# Patient Record
Sex: MALE | Race: WHITE | NOT HISPANIC OR LATINO | Employment: FULL TIME | URBAN - METROPOLITAN AREA
[De-identification: names, ages, dates, MRNs, and addresses within clinical notes are randomized per-mention and may not be internally consistent; named-entity substitution may affect disease eponyms.]

---

## 2018-08-23 ENCOUNTER — TELEPHONE (OUTPATIENT)
Dept: FAMILY MEDICINE | Facility: CLINIC | Age: 43
End: 2018-08-23

## 2018-08-23 ENCOUNTER — OFFICE VISIT (OUTPATIENT)
Dept: FAMILY MEDICINE | Facility: CLINIC | Age: 43
End: 2018-08-23
Payer: COMMERCIAL

## 2018-08-23 VITALS
WEIGHT: 200 LBS | HEART RATE: 71 BPM | DIASTOLIC BLOOD PRESSURE: 78 MMHG | BODY MASS INDEX: 28 KG/M2 | HEIGHT: 71 IN | OXYGEN SATURATION: 97 % | SYSTOLIC BLOOD PRESSURE: 120 MMHG | TEMPERATURE: 98.3 F

## 2018-08-23 DIAGNOSIS — R10.13 ABDOMINAL PAIN, EPIGASTRIC: Primary | ICD-10-CM

## 2018-08-23 PROCEDURE — 99203 OFFICE O/P NEW LOW 30 MIN: CPT | Performed by: NURSE PRACTITIONER

## 2018-08-23 RX ORDER — SUCRALFATE 1 G/1
1 TABLET ORAL 4 TIMES DAILY PRN
Qty: 30 TABLET | Refills: 0 | Status: SHIPPED | OUTPATIENT
Start: 2018-08-23 | End: 2019-07-29

## 2018-08-23 NOTE — PATIENT INSTRUCTIONS
See how you feel tomorrow. If you still have discomfort, then start the medication. Take it 1 hour before eating or 2 hours after eating. This will coat the stomach and help it heal.  Eat a really bland diet.   No coffee or alcohol.   If you symptoms do not resolve or you develop new symptoms, please let us know       Tips to Control Acid Reflux  To control acid reflux, you ll need to make some basic diet and lifestyle changes. The simple steps outlined below may be all you ll need to ease discomfort.  Watch what you eat    Avoid fatty foods and spicy foods.    Eat fewer acidic foods, such as citrus and tomato-based foods. These can increase symptoms.    Limit drinking alcohol, caffeine, and fizzy beverages. All increase acid reflux.    Try limiting chocolate, peppermint, and spearmint. These can worsen acid reflux in some people.  Watch when you eat    Avoid lying down for 3 hours after eating.    Do not snack before going to bed.  Raise your head  Raising your head and upper body by 4 to 6 inches helps limit reflux when you re lying down. Put blocks under the head of your bed frame to raise it.  Other changes    Lose weight, if you need to    Don t exercise near bedtime    Avoid tight-fitting clothes    Limit aspirin and ibuprofen    Stop smoking   Date Last Reviewed: 7/1/2016 2000-2017 [employer]. [address] [facility], [place] [address]. All rights reserved. This information is not intended as a substitute for professional medical care. Always follow your healthcare professional's instructions.

## 2018-08-23 NOTE — TELEPHONE ENCOUNTER
Reason for call:  Other   Patient called regarding (reason for call): establish care  Additional comments: Patient was referred to Dr. Negro by Dr. Charbel Garcia at [facility]. Will Dr. Negro be willing to accept patient to establish care? If so, can he also be seen today at 4:30pm with Dr. Negro?     Phone number to reach patient:  Cell number on file:    Telephone Information:   Mobile [phone]       Best Time:  Any time    Can we leave a detailed message on this number?  YES     Yanna GILLETTE  Central Scheduler

## 2018-08-23 NOTE — PROGRESS NOTES
HPI    SUBJECTIVE:   Henry Kennedy is a 43 year old male who presents to clinic today for the following health issues:      Abdominal Pain      Duration: 2 weeks    Description (location/character/radiation): upper abdominal pain, chills       Associated flank pain: None    Intensity:  moderate    Accompanying signs and symptoms:        Fever/Chills: YES       Gas/Bloating: no        Nausea/vomitting: no        Diarrhea: YES       Dysuria or Hematuria: no     History (previous similar pain/trauma/previous testing):     Precipitating or alleviating factors:       Pain worse with eating/BM/urination: no       Pain relieved by BM: no     Therapies tried and outcome: tums    LMP:  not applicable    Had a salad Monday and a couple hours later had these abdominal symptoms   Woke him up at night some   Pain was waxing and waning  Eating felt better but hald hour later felt worse   Initially had chills   Diarrhea especially for the first week   No sick contacts, recent travel or antibiotics   Not on any meds   Feeling more dehydrated   Early on had heartburn type symptoms   Water would even cause the pain to be worse   No significant PMH   Definitely seemed worse on an empty stomach   Ate a lot of pizza on Sunday and since then has felt better   No significant coffee use   Rare etoh     No past medical history on file.  No family history on file.  No past surgical history on file.  Social History   Substance Use Topics     Smoking status: Never Smoker     Smokeless tobacco: Never Used     Alcohol use Not on file     Current Outpatient Prescriptions   Medication Sig Dispense Refill     sucralfate (CARAFATE) 1 GM tablet Take 1 tablet (1 g) by mouth 4 times daily as needed 30 tablet 0     No Known Allergies    Reviewed and updated as needed this visit by clinical staff and provider      ROS  Detailed as above       /78 (BP Location: Right arm, Patient Position: Sitting, Cuff Size: Adult Large)  Pulse 71  Temp 98.3  F  "(36.8  C) (Oral)  Ht 5' 11\" (1.803 m)  Wt 200 lb (90.7 kg)  SpO2 97%  BMI 27.89 kg/m2    Physical Exam   Constitutional: He is well-developed, well-nourished, and in no distress.   HENT:   Head: Normocephalic.   Eyes: Conjunctivae are normal.   Pulmonary/Chest: Effort normal.   Abdominal: Soft. Bowel sounds are normal. There is no tenderness.   Musculoskeletal: Normal range of motion.   Neurological: He is alert.   Psychiatric: Mood and affect normal.   Vitals reviewed.        Assessment and Plan:       ICD-10-CM    1. Abdominal pain, epigastric R10.13 sucralfate (CARAFATE) 1 GM tablet     Epigastric pain that is greatly improved. Possible food-borne vs PUD     Patient Instructions     See how you feel tomorrow. If you still have discomfort, then start the medication. Take it 1 hour before eating or 2 hours after eating. This will coat the stomach and help it heal.  Eat a really bland diet.   No coffee or alcohol.   If you symptoms do not resolve or you develop new symptoms, please let us know         KIMMIE Vaughn, CNP  [facility]    "

## 2018-08-23 NOTE — MR AVS SNAPSHOT
After Visit Summary   8/23/2018    Henry Kennedy    MRN: [id number]           Patient Information     Date Of Birth          1975        Visit Information        Provider Department      8/23/2018 4:30 PM Vanesa Garcia APRN [facility]        Today's Diagnoses     Abdominal pain, epigastric    -  1      Care Instructions    See how you feel tomorrow. If you still have discomfort, then start the medication. Take it 1 hour before eating or 2 hours after eating. This will coat the stomach and help it heal.  Eat a really bland diet.   No coffee or alcohol.   If you symptoms do not resolve or you develop new symptoms, please let us know       Tips to Control Acid Reflux  To control acid reflux, you ll need to make some basic diet and lifestyle changes. The simple steps outlined below may be all you ll need to ease discomfort.  Watch what you eat    Avoid fatty foods and spicy foods.    Eat fewer acidic foods, such as citrus and tomato-based foods. These can increase symptoms.    Limit drinking alcohol, caffeine, and fizzy beverages. All increase acid reflux.    Try limiting chocolate, peppermint, and spearmint. These can worsen acid reflux in some people.  Watch when you eat    Avoid lying down for 3 hours after eating.    Do not snack before going to bed.  Raise your head  Raising your head and upper body by 4 to 6 inches helps limit reflux when you re lying down. Put blocks under the head of your bed frame to raise it.  Other changes    Lose weight, if you need to    Don t exercise near bedtime    Avoid tight-fitting clothes    Limit aspirin and ibuprofen    Stop smoking   Date Last Reviewed: 7/1/2016 2000-2017 [employer] [address] [place] [address], [place] [address]. All rights reserved. This information is not intended as a substitute for professional medical care. Always follow your healthcare professional's instructions.                Follow-ups after your  "visit        Who to contact     If you have questions or need follow up information about today's clinic visit or your schedule please contact [facility] directly at [phone].  Normal or non-critical lab and imaging results will be communicated to you by MyChart, letter or phone within 4 business days after the clinic has received the results. If you do not hear from us within 7 days, please contact the clinic through MyChart or phone. If you have a critical or abnormal lab result, we will notify you by phone as soon as possible.  Submit refill requests through [employer] or call your pharmacy and they will forward the refill request to us. Please allow 3 business days for your refill to be completed.          Additional Information About Your Visit        Care EveryWhere ID     This is your Care EveryWhere ID. This could be used by other organizations to access your [place] medical records  [id number]        Your Vitals Were     Pulse Temperature Height Pulse Oximetry BMI (Body Mass Index)       71 98.3  F (36.8  C) (Oral) 5' 11\" (1.803 m) 97% 27.89 kg/m2        Blood Pressure from Last 3 Encounters:   08/23/18 120/78   06/24/10 104/66   06/22/10 102/60    Weight from Last 3 Encounters:   08/23/18 200 lb (90.7 kg)   06/22/10 194 lb (88 kg)              Today, you had the following     No orders found for display         Today's Medication Changes          These changes are accurate as of 8/23/18  5:04 PM.  If you have any questions, ask your nurse or doctor.               Start taking these medicines.        Dose/Directions    sucralfate 1 GM tablet   Commonly known as:  CARAFATE   Used for:  Abdominal pain, epigastric   Started by:  Vanesa Garcia APRN CNP        Dose:  1 g   Take 1 tablet (1 g) by mouth 4 times daily as needed   Quantity:  30 tablet   Refills:  0         Stop taking these medicines if you haven't already. Please contact your care team if you have questions.     order for " DME   Stopped by:  Vanesa Garcia APRN CNP                Where to get your medicines      Some of these will need a paper prescription and others can be bought over the counter.  Ask your nurse if you have questions.     Bring a paper prescription for each of these medications     sucralfate 1 GM tablet                Primary Care Provider Office Phone # Fax #    Velasquez FIGUEROA MD Marky [phone] [address] [facility] [address]  [facility] [address]        Equal Access to Services     [facility]RIST[facility] : Hadii aad ku hadasho Soomaali, waaxda luqadaha, qaybta kaalmada adeegyada, waxay idiin hayaan adeeg kharash la'gabino . So [facility] [phone].    ATENCIÓN: Si habla español, tiene a hatch disposición servicios gratuitos de asistencia lingüística. [facility] [phone].    We comply with applicable federal civil rights laws and [place] laws. We do not discriminate on the basis of race, color, national origin, age, disability, sex, sexual orientation, or gender identity.            Thank you!     Thank you for choosing [facility]  for your care. Our goal is always to provide you with excellent care. Hearing back from our patients is one way we can continue to improve our services. Please take a few minutes to complete the written survey that you may receive in the mail after your visit with us. Thank you!             Your Updated Medication List - Protect others around you: Learn how to safely use, store and throw away your medicines at www.disposemymeds.org.          This list is accurate as of 8/23/18  5:04 PM.  Always use your most recent med list.                   Brand Name Dispense Instructions for use Diagnosis    sucralfate 1 GM tablet    CARAFATE    30 tablet    Take 1 tablet (1 g) by mouth 4 times daily as needed    Abdominal pain, epigastric

## 2019-07-29 ENCOUNTER — OFFICE VISIT (OUTPATIENT)
Dept: FAMILY MEDICINE | Facility: CLINIC | Age: 44
End: 2019-07-29
Payer: COMMERCIAL

## 2019-07-29 VITALS
SYSTOLIC BLOOD PRESSURE: 114 MMHG | HEIGHT: 70 IN | WEIGHT: 207.7 LBS | TEMPERATURE: 97.7 F | BODY MASS INDEX: 29.73 KG/M2 | HEART RATE: 76 BPM | OXYGEN SATURATION: 96 % | DIASTOLIC BLOOD PRESSURE: 70 MMHG

## 2019-07-29 DIAGNOSIS — J45.40 MODERATE PERSISTENT ASTHMA WITHOUT COMPLICATION: ICD-10-CM

## 2019-07-29 DIAGNOSIS — Z23 NEED FOR VACCINATION: ICD-10-CM

## 2019-07-29 DIAGNOSIS — Z11.4 ENCOUNTER FOR SCREENING FOR HIV: ICD-10-CM

## 2019-07-29 DIAGNOSIS — Z00.00 ROUTINE GENERAL MEDICAL EXAMINATION AT A HEALTH CARE FACILITY: Primary | ICD-10-CM

## 2019-07-29 DIAGNOSIS — Z12.11 SCREENING FOR COLON CANCER: ICD-10-CM

## 2019-07-29 DIAGNOSIS — R74.8 ELEVATED LIVER ENZYMES: ICD-10-CM

## 2019-07-29 DIAGNOSIS — Z12.5 PROSTATE CANCER SCREENING: ICD-10-CM

## 2019-07-29 LAB
ERYTHROCYTE [DISTWIDTH] IN BLOOD BY AUTOMATED COUNT: 13.1 % (ref 10–15)
HCT VFR BLD AUTO: 47.9 % (ref 40–53)
HGB BLD-MCNC: 16.8 G/DL (ref 13.3–17.7)
MCH RBC QN AUTO: 30.1 PG (ref 26.5–33)
MCHC RBC AUTO-ENTMCNC: 35.1 G/DL (ref 31.5–36.5)
MCV RBC AUTO: 86 FL (ref 78–100)
PLATELET # BLD AUTO: 160 10E9/L (ref 150–450)
RBC # BLD AUTO: 5.58 10E12/L (ref 4.4–5.9)
WBC # BLD AUTO: 5 10E9/L (ref 4–11)

## 2019-07-29 PROCEDURE — 90715 TDAP VACCINE 7 YRS/> IM: CPT | Performed by: INTERNAL MEDICINE

## 2019-07-29 PROCEDURE — 36415 COLL VENOUS BLD VENIPUNCTURE: CPT | Performed by: INTERNAL MEDICINE

## 2019-07-29 PROCEDURE — 85027 COMPLETE CBC AUTOMATED: CPT | Performed by: INTERNAL MEDICINE

## 2019-07-29 PROCEDURE — 99386 PREV VISIT NEW AGE 40-64: CPT | Mod: 25 | Performed by: INTERNAL MEDICINE

## 2019-07-29 PROCEDURE — 90471 IMMUNIZATION ADMIN: CPT | Performed by: INTERNAL MEDICINE

## 2019-07-29 PROCEDURE — 87389 HIV-1 AG W/HIV-1&-2 AB AG IA: CPT | Performed by: INTERNAL MEDICINE

## 2019-07-29 PROCEDURE — 80061 LIPID PANEL: CPT | Performed by: INTERNAL MEDICINE

## 2019-07-29 PROCEDURE — 80053 COMPREHEN METABOLIC PANEL: CPT | Performed by: INTERNAL MEDICINE

## 2019-07-29 PROCEDURE — G0103 PSA SCREENING: HCPCS | Performed by: INTERNAL MEDICINE

## 2019-07-29 RX ORDER — ALBUTEROL SULFATE 90 UG/1
2 AEROSOL, METERED RESPIRATORY (INHALATION) EVERY 6 HOURS PRN
Qty: 1 INHALER | Refills: 11 | Status: SHIPPED | OUTPATIENT
Start: 2019-07-29 | End: 2022-05-19

## 2019-07-29 RX ORDER — MONTELUKAST SODIUM 10 MG/1
10 TABLET ORAL AT BEDTIME
COMMUNITY
End: 2019-07-29

## 2019-07-29 RX ORDER — MONTELUKAST SODIUM 10 MG/1
10 TABLET ORAL AT BEDTIME
Qty: 90 TABLET | Refills: 3 | Status: SHIPPED | OUTPATIENT
Start: 2019-07-29 | End: 2022-05-19

## 2019-07-29 SDOH — HEALTH STABILITY: MENTAL HEALTH: HOW MANY STANDARD DRINKS CONTAINING ALCOHOL DO YOU HAVE ON A TYPICAL DAY?: 5 OR 6

## 2019-07-29 ASSESSMENT — MIFFLIN-ST. JEOR: SCORE: 1835.86

## 2019-07-29 NOTE — PROGRESS NOTES
SUBJECTIVE:   CC: Henry Kennedy is an 44 year old male who presents for preventative health visit.     Healthy Habits:     Getting at least 3 servings of Calcium per day:  NO    Bi-annual eye exam:  NO    Dental care twice a year:  Yes    Sleep apnea or symptoms of sleep apnea:  None    Diet:  Regular (no restrictions)    Frequency of exercise:  4-5 days/week    Duration of exercise:  Greater than 60 minutes    Taking medications regularly:  No    Barriers to taking medications:  None    Medication side effects:  None    PHQ-2 Total Score: 0    Additional concerns today:  Yes      Today's PHQ-2 Score:   PHQ-2 ( 1999 Pfizer) 7/29/2019   Q1: Little interest or pleasure in doing things 0   Q2: Feeling down, depressed or hopeless 0   PHQ-2 Score 0       Abuse: Current or Past(Physical, Sexual or Emotional)- No  Do you feel safe in your environment? Yes    Social History     Tobacco Use     Smoking status: Never Smoker     Smokeless tobacco: Never Used   Substance Use Topics     Alcohol use: Not on file     If you drink alcohol do you typically have >3 drinks per day or >7 drinks per week? No    Alcohol Use 7/29/2019   Prescreen: >3 drinks/day or >7 drinks/week? No       Last PSA: No results found for: PSA    Reviewed orders with patient. Reviewed health maintenance and updated orders accordingly - Yes  There is no problem list on file for this patient.    Past Surgical History:   Procedure Laterality Date     ORTHOPEDIC SURGERY      thumb fracture repair        Social History     Tobacco Use     Smoking status: Never Smoker     Smokeless tobacco: Never Used   Substance Use Topics     Alcohol use: Not on file     Family History   Problem Relation Age of Onset     Aortic stenosis Father      Coronary Artery Disease Father      Diabetes Type 2  Father      Hypothyroidism Sister          Current Outpatient Medications   Medication Sig Dispense Refill     montelukast (SINGULAIR) 10 MG tablet Take 10 mg by mouth At Bedtime    "    Allergies   Allergen Reactions     Beer      Cats      Grass      Peanuts [Nuts]      Seasonal Allergies        Reviewed and updated as needed this visit by clinical staff  Tobacco  Allergies  Meds  Surg Hx  Fam Hx  Soc Hx        Reviewed and updated as needed this visit by Provider  Tobacco  Surg Hx  Fam Hx  Soc Hx       No past medical history on file.   Past Surgical History:   Procedure Laterality Date     ORTHOPEDIC SURGERY      thumb fracture repair        Review of Systems  A 10 organ systems ROS is negative.     OBJECTIVE:   /70 (BP Location: Right arm, Patient Position: Sitting, Cuff Size: Adult Regular)   Pulse 76   Temp 97.7  F (36.5  C) (Oral)   Ht 1.79 m (5' 10.47\")   Wt 94.2 kg (207 lb 11.2 oz)   SpO2 96%   BMI 29.40 kg/m      Physical Exam  GENERAL: healthy, alert and no distress  EYES: Eyes grossly normal to inspection, PERRL and conjunctivae and sclerae normal  HENT: ear canals and TM's normal, nose and mouth without ulcers or lesions  NECK: no adenopathy, no asymmetry, masses, or scars and thyroid normal to palpation  RESP: lungs clear to auscultation - no rales, rhonchi or wheezes  CV: regular rate and rhythm, normal S1 S2, no S3 or S4, no murmur, click or rub, no peripheral edema and peripheral pulses strong  ABDOMEN: soft, nontender, no hepatosplenomegaly, no masses and bowel sounds normal  RECTAL: normal sphincter tone, no rectal masses, prostate normal size, smooth, nontender without nodules or masses  MS: no gross musculoskeletal defects noted, no edema  SKIN: no suspicious lesions or rashes  NEURO: Normal strength and tone, mentation intact and speech normal  PSYCH: mentation appears normal, affect normal/bright    Labs pending     ASSESSMENT/PLAN:   1. Routine general medical examination at a health care facility    - Lipid panel reflex to direct LDL Fasting  - CBC with platelets  - Comprehensive metabolic panel    2. Moderate persistent asthma without " "complication  Stable on current regimen   - montelukast (SINGULAIR) 10 MG tablet; Take 1 tablet (10 mg) by mouth At Bedtime  Dispense: 90 tablet; Refill: 3  - albuterol (PROAIR HFA/PROVENTIL HFA/VENTOLIN HFA) 108 (90 Base) MCG/ACT inhaler; Inhale 2 puffs into the lungs every 6 hours as needed for shortness of breath / dyspnea or wheezing  Dispense: 1 Inhaler; Refill: 11    3. Prostate cancer screening    - Prostate spec antigen screen    4. Screening for colon cancer    - Fecal colorectal cancer screen FIT; Future    5. Encounter for screening for HIV    - HIV Antigen Antibody Combo    COUNSELING:   Reviewed preventive health counseling, as reflected in patient instructions  Special attention given to:        Regular exercise       Healthy diet/nutrition       Immunizations    Tdap today             HIV screeninx in teen years, 1x in adult years, and at intervals if high risk; will do today        Colon cancer screening; FIT test today       Prostate cancer screening; PSA today     Estimated body mass index is 29.4 kg/m  as calculated from the following:    Height as of this encounter: 1.79 m (5' 10.47\").    Weight as of this encounter: 94.2 kg (207 lb 11.2 oz).     Weight management plan: Discussed healthy diet and exercise guidelines     reports that he has never smoked. He has never used smokeless tobacco.      Counseling Resources:  ATP IV Guidelines  Pooled Cohorts Equation Calculator  FRAX Risk Assessment  ICSI Preventive Guidelines  Dietary Guidelines for Americans,   USDA's MyPlate  ASA Prophylaxis  Lung CA Screening    Velasquez Negro MD  [facility]  "

## 2019-07-29 NOTE — PROGRESS NOTES
Screening Questionnaire for Adult Immunization    Are you sick today?   No   Do you have allergies to medications, food, a vaccine component or latex?   No   Have you ever had a serious reaction after receiving a vaccination?   No   Do you have a long-term health problem with heart disease, lung disease, asthma, kidney disease, metabolic disease (e.g. diabetes), anemia, or other blood disorder?   No   Do you have cancer, leukemia, HIV/AIDS, or any other immune system problem?   Yes   In the past 3 months, have you taken medications that affect  your immune system, such as prednisone, other steroids, or anticancer drugs; drugs for the treatment of rheumatoid arthritis, Crohn s disease, or psoriasis; or have you had radiation treatments?   No   Have you had a seizure, or a brain or other nervous system problem?   No   During the past year, have you received a transfusion of blood or blood     products, or been given immune (gamma) globulin or antiviral drug?   No   For women: Are you pregnant or is there a chance you could become        pregnant during the next month?   No   Have you received any vaccinations in the past 4 weeks?   No     Immunization questionnaire was positive for at least one answer.  Notified Dr. Negro.        Per orders of Dr. Negro, injection of Tdap given by Leigh Hinson. Patient instructed to remain in clinic for 15 minutes afterwards, and to report any adverse reaction to me immediately.       Screening performed by Leigh Hinson on 7/29/2019 at 5:07 PM.

## 2019-07-29 NOTE — LETTER
"[facility]  [address] [facility]  [address]  [place]  [address]  Tel: [phone]    July 31, 2019    Henry Acevesifert  [address] [place] [address]        Dear Mr. Kennedy,    The following letter pertains to your most recent diagnostic tests:    -Your HIV test is negative.     -Your prostate specific antigen (PSA) test result returned normal.     -Your total cholesterol is 181 which is at your goal of total cholesterol less than 200.    -Your triglycerides are 237 which are above your goal of triglycerides less than 150.    -Your HDL or \"good cholesterol\" is 34 which is below your goal of HDL cholesterol greater than 40.    -Your LDL cholesterol or \"bad cholesterol\" is 100 which is at your goal of LDL cholesterol less than <160.  Your LDL goal is based on your risk factors for artery disease.     -Liver test ALT is mildly elevated, kidney function is normal for you (Creatinine, GFR), Sodium is normal, Potassium is normal for you, Calcium is normal for you, Glucose (blood sugar) is normal for you.      -Your complete blood counts including your hemoglobin returned normal for you.         Bottom line:  Your liver test is mildly elevated.  This is a common finding and usually not dangerous.  It could be related to over the counter medications, viral infections, mild dehydration, or excess fat in the liver.  I would recommend rechecking as a first step.  I would recommend a recheck in the next 3 weeks or so.   If the test remains persistently elevated, further evaluation may be necessary.    Reducing carbohydrates and fats in your diet, losing weight and consuming less alcohol can improve your triglyceride levels.     Increasing exercise can improve HDL (\"good cholesterol\") levels.  A good target to shoot for is 30 minutes of aerobic activity at least 4 days per week although you may already be doing this.        Follow up:  Lab appointment in 3-4 weeks to recheck liver test. "        Sincerely,    Dr. Negro/JIML    The 10-year ASCVD risk score (Natividad CHAVEZ Jr., et al., 2013) is: 2.5%    Values used to calculate the score:      Age: 44 years      Sex: Male      Is Non- : No      Diabetic: No      Tobacco smoker: No      Systolic Blood Pressure: 114 mmHg      Is BP treated: No      HDL Cholesterol: 34 mg/dL      Total Cholesterol: 181 mg/dL      Enclosure: Lab Results  Results for orders placed or performed in visit on 07/29/19   Prostate spec antigen screen   Result Value Ref Range    PSA 0.38 0 - 4 ug/L   Lipid panel reflex to direct LDL Fasting   Result Value Ref Range    Cholesterol 181 <200 mg/dL    Triglycerides 237 (H) <150 mg/dL    HDL Cholesterol 34 (L) >39 mg/dL    LDL Cholesterol Calculated 100 (H) <100 mg/dL    Non HDL Cholesterol 147 (H) <130 mg/dL   CBC with platelets   Result Value Ref Range    WBC 5.0 4.0 - 11.0 10e9/L    RBC Count 5.58 4.4 - 5.9 10e12/L    Hemoglobin 16.8 13.3 - 17.7 g/dL    Hematocrit 47.9 40.0 - 53.0 %    MCV 86 78 - 100 fl    MCH 30.1 26.5 - 33.0 pg    MCHC 35.1 31.5 - 36.5 g/dL    RDW 13.1 10.0 - 15.0 %    Platelet Count 160 150 - 450 10e9/L   Comprehensive metabolic panel   Result Value Ref Range    Sodium 140 133 - 144 mmol/L    Potassium 4.4 3.4 - 5.3 mmol/L    Chloride 108 94 - 109 mmol/L    Carbon Dioxide 24 20 - 32 mmol/L    Anion Gap 8 3 - 14 mmol/L    Glucose 81 70 - 99 mg/dL    Urea Nitrogen 18 7 - 30 mg/dL    Creatinine 0.81 0.66 - 1.25 mg/dL    GFR Estimate >90 >60 mL/min/[1.73_m2]    GFR Estimate If Black >90 >60 mL/min/[1.73_m2]    Calcium 8.8 8.5 - 10.1 mg/dL    Bilirubin Total 0.6 0.2 - 1.3 mg/dL    Albumin 4.2 3.4 - 5.0 g/dL    Protein Total 7.9 6.8 - 8.8 g/dL    Alkaline Phosphatase 60 40 - 150 U/L    ALT 82 (H) 0 - 70 U/L    AST 45 0 - 45 U/L   HIV Antigen Antibody Combo   Result Value Ref Range    HIV Antigen Antibody Combo Nonreactive NR^Nonreactive

## 2019-07-30 LAB
ALBUMIN SERPL-MCNC: 4.2 G/DL (ref 3.4–5)
ALP SERPL-CCNC: 60 U/L (ref 40–150)
ALT SERPL W P-5'-P-CCNC: 82 U/L (ref 0–70)
ANION GAP SERPL CALCULATED.3IONS-SCNC: 8 MMOL/L (ref 3–14)
AST SERPL W P-5'-P-CCNC: 45 U/L (ref 0–45)
BILIRUB SERPL-MCNC: 0.6 MG/DL (ref 0.2–1.3)
BUN SERPL-MCNC: 18 MG/DL (ref 7–30)
CALCIUM SERPL-MCNC: 8.8 MG/DL (ref 8.5–10.1)
CHLORIDE SERPL-SCNC: 108 MMOL/L (ref 94–109)
CHOLEST SERPL-MCNC: 181 MG/DL
CO2 SERPL-SCNC: 24 MMOL/L (ref 20–32)
CREAT SERPL-MCNC: 0.81 MG/DL (ref 0.66–1.25)
GFR SERPL CREATININE-BSD FRML MDRD: >90 ML/MIN/{1.73_M2}
GLUCOSE SERPL-MCNC: 81 MG/DL (ref 70–99)
HDLC SERPL-MCNC: 34 MG/DL
LDLC SERPL CALC-MCNC: 100 MG/DL
NONHDLC SERPL-MCNC: 147 MG/DL
POTASSIUM SERPL-SCNC: 4.4 MMOL/L (ref 3.4–5.3)
PROT SERPL-MCNC: 7.9 G/DL (ref 6.8–8.8)
PSA SERPL-ACNC: 0.38 UG/L (ref 0–4)
SODIUM SERPL-SCNC: 140 MMOL/L (ref 133–144)
TRIGL SERPL-MCNC: 237 MG/DL

## 2019-07-30 ASSESSMENT — ASTHMA QUESTIONNAIRES: ACT_TOTALSCORE: 25

## 2019-07-31 LAB — HIV 1+2 AB+HIV1 P24 AG SERPL QL IA: NONREACTIVE

## 2019-07-31 NOTE — RESULT ENCOUNTER NOTE
"The following letter pertains to your most recent diagnostic tests:    -Your HIV test is negative.     -Your prostate specific antigen (PSA) test result returned normal.     -Your total cholesterol is 181 which is at your goal of total cholesterol less than 200.    -Your triglycerides are 237 which are above your goal of triglycerides less than 150.    -Your HDL or \"good cholesterol\" is 34 which is below your goal of HDL cholesterol greater than 40.    -Your LDL cholesterol or \"bad cholesterol\" is 100 which is at your goal of LDL cholesterol less than <160.  Your LDL goal is based on your risk factors for artery disease.     -Liver test ALT is mildly elevated, kidney function is normal for you (Creatinine, GFR), Sodium is normal, Potassium is normal for you, Calcium is normal for you, Glucose (blood sugar) is normal for you.      -Your complete blood counts including your hemoglobin returned normal for you.         Bottom line:  Your liver test is mildly elevated.  This is a common finding and usually not dangerous.  It could be related to over the counter medications, viral infections, mild dehydration, or excess fat in the liver.  I would recommend rechecking as a first step.  I would recommend a recheck in the next 3 weeks or so.   If the test remains persistently elevated, further evaluation may be necessary.    Reducing carbohydrates and fats in your diet, losing weight and consuming less alcohol can improve your triglyceride levels.     Increasing exercise can improve HDL (\"good cholesterol\") levels.  A good target to shoot for is 30 minutes of aerobic activity at least 4 days per week although you may already be doing this.        Follow up:  Lab appointment in 3-4 weeks to recheck liver test.        Sincerely,    Dr. Negro    The 10-year ASCVD risk score ([place] KATHY Jr., et al., 2013) is: 2.5%    Values used to calculate the score:      Age: 44 years      Sex: Male      Is Non- : No     "  Diabetic: No      Tobacco smoker: No      Systolic Blood Pressure: 114 mmHg      Is BP treated: No      HDL Cholesterol: 34 mg/dL      Total Cholesterol: 181 mg/dL

## 2020-02-05 ENCOUNTER — OFFICE VISIT (OUTPATIENT)
Dept: FAMILY MEDICINE | Facility: CLINIC | Age: 45
End: 2020-02-05
Payer: COMMERCIAL

## 2020-02-05 VITALS
TEMPERATURE: 97.9 F | BODY MASS INDEX: 30.04 KG/M2 | HEART RATE: 67 BPM | OXYGEN SATURATION: 94 % | DIASTOLIC BLOOD PRESSURE: 77 MMHG | SYSTOLIC BLOOD PRESSURE: 117 MMHG | WEIGHT: 212.2 LBS

## 2020-02-05 DIAGNOSIS — J06.9 VIRAL URI: Primary | ICD-10-CM

## 2020-02-05 PROCEDURE — 99212 OFFICE O/P EST SF 10 MIN: CPT | Performed by: NURSE PRACTITIONER

## 2020-02-05 NOTE — PROGRESS NOTES
Subjective     Henry Kennedy is a 44 year old male who presents to clinic today for the following health issues:    HPI       Sinus pressure   [facility]   Was feeling better  Son had high fever that was likely influenza and then last Friday pt developed illness again   States 4 years ago his ear drum ruptured on a plane so was concerned about his ear       No past medical history on file.  Family History   Problem Relation Age of Onset     Aortic stenosis Father      Coronary Artery Disease Father      Diabetes Type 2  Father      Hypothyroidism Sister      Past Surgical History:   Procedure Laterality Date     ORTHOPEDIC SURGERY      thumb fracture repair      Social History     Tobacco Use     Smoking status: Never Smoker     Smokeless tobacco: Never Used   Substance Use Topics     Alcohol use: Not on file     Current Outpatient Medications   Medication Sig Dispense Refill     albuterol (PROAIR HFA/PROVENTIL HFA/VENTOLIN HFA) 108 (90 Base) MCG/ACT inhaler Inhale 2 puffs into the lungs every 6 hours as needed for shortness of breath / dyspnea or wheezing 1 Inhaler 11     montelukast (SINGULAIR) 10 MG tablet Take 1 tablet (10 mg) by mouth At Bedtime 90 tablet 3     Allergies   Allergen Reactions     Beer      Cats      Grass      Peanuts [Nuts]      Seasonal Allergies        Reviewed and updated as needed this visit by clinical staff and provider     Review of Systems   Detailed as above       Objective    /77 (BP Location: Right arm, Patient Position: Sitting, Cuff Size: Adult Regular)   Pulse 67   Temp 97.9  F (36.6  C) (Oral)   Wt 96.3 kg (212 lb 3.2 oz)   SpO2 94%   BMI 30.04 kg/m      Physical Exam  Constitutional:       Appearance: He is well-developed.   HENT:      Head: Normocephalic.      Right Ear: Tympanic membrane, ear canal and external ear normal.      Left Ear: Tympanic membrane, ear canal and external ear normal.      Mouth/Throat:       Pharynx: No oropharyngeal exudate.   Eyes:      Conjunctiva/sclera: Conjunctivae normal.   Neck:      Musculoskeletal: Normal range of motion.   Cardiovascular:      Rate and Rhythm: Normal rate and regular rhythm.      Heart sounds: Normal heart sounds. No murmur.   Pulmonary:      Effort: Pulmonary effort is normal. No respiratory distress.      Breath sounds: Normal breath sounds.   Musculoskeletal: Normal range of motion.   Lymphadenopathy:      Cervical: No cervical adenopathy.   Skin:     General: Skin is warm and dry.   Neurological:      Mental Status: He is alert.   Psychiatric:         Mood and Affect: Mood normal.            Assessment & Plan       ICD-10-CM    1. Viral URI J06.9         Looks great today on exam   Suspect viral etiology today   Call if worsening or not improving       KIMMIE Irving [facility]

## 2020-02-06 ASSESSMENT — ASTHMA QUESTIONNAIRES: ACT_TOTALSCORE: 19

## 2020-07-08 ENCOUNTER — TELEPHONE (OUTPATIENT)
Dept: FAMILY MEDICINE | Facility: CLINIC | Age: 45
End: 2020-07-08

## 2020-07-08 NOTE — TELEPHONE ENCOUNTER
Panel Management Review      Patient has the following on his problem list: None      Composite cancer screening  Chart review shows that this patient is due/due soon for the following None  Summary:    Patient is due/failing the following:   ACT    Action needed:   Patient needs to do ACT.    Type of outreach:    Sent letter.    Questions for provider review:    None                                                                                                                                    Leigh Hinson MA on 7/8/2020 at 2:47 PM

## 2022-05-19 ENCOUNTER — OFFICE VISIT (OUTPATIENT)
Dept: FAMILY MEDICINE | Facility: CLINIC | Age: 47
End: 2022-05-19
Payer: COMMERCIAL

## 2022-05-19 VITALS
SYSTOLIC BLOOD PRESSURE: 122 MMHG | BODY MASS INDEX: 32.05 KG/M2 | DIASTOLIC BLOOD PRESSURE: 83 MMHG | OXYGEN SATURATION: 94 % | TEMPERATURE: 98 F | HEART RATE: 71 BPM | RESPIRATION RATE: 16 BRPM | WEIGHT: 223.9 LBS | HEIGHT: 70 IN

## 2022-05-19 DIAGNOSIS — J45.40 MODERATE PERSISTENT ASTHMA WITHOUT COMPLICATION: ICD-10-CM

## 2022-05-19 DIAGNOSIS — K21.9 GASTROESOPHAGEAL REFLUX DISEASE, UNSPECIFIED WHETHER ESOPHAGITIS PRESENT: ICD-10-CM

## 2022-05-19 DIAGNOSIS — D17.30 LIPOMA OF SKIN AND SUBCUTANEOUS TISSUE: Primary | ICD-10-CM

## 2022-05-19 DIAGNOSIS — Z12.11 SCREEN FOR COLON CANCER: ICD-10-CM

## 2022-05-19 PROCEDURE — 99214 OFFICE O/P EST MOD 30 MIN: CPT | Performed by: INTERNAL MEDICINE

## 2022-05-19 RX ORDER — MONTELUKAST SODIUM 10 MG/1
10 TABLET ORAL AT BEDTIME
Qty: 90 TABLET | Refills: 3 | Status: SHIPPED | OUTPATIENT
Start: 2022-05-19

## 2022-05-19 RX ORDER — FAMOTIDINE 40 MG/1
40 TABLET, FILM COATED ORAL 2 TIMES DAILY PRN
Qty: 180 TABLET | Refills: 3 | Status: SHIPPED | OUTPATIENT
Start: 2022-05-19 | End: 2022-05-19

## 2022-05-19 RX ORDER — FAMOTIDINE 40 MG/1
40 TABLET, FILM COATED ORAL 2 TIMES DAILY PRN
Qty: 180 TABLET | Refills: 3 | Status: SHIPPED | OUTPATIENT
Start: 2022-05-19

## 2022-05-19 RX ORDER — ALBUTEROL SULFATE 90 UG/1
2 AEROSOL, METERED RESPIRATORY (INHALATION) EVERY 6 HOURS PRN
Qty: 18 G | Refills: 11 | Status: SHIPPED | OUTPATIENT
Start: 2022-05-19

## 2022-05-19 ASSESSMENT — PAIN SCALES - GENERAL: PAINLEVEL: NO PAIN (0)

## 2022-05-19 ASSESSMENT — ASTHMA QUESTIONNAIRES: ACT_TOTALSCORE: 23

## 2022-05-19 NOTE — PROGRESS NOTES
"  Assessment & Plan     Lipoma of skin and subcutaneous tissue  See dr. kamara to discuss merits of excision   - Adult General Surg Referral; Future    Gastroesophageal reflux disease, unspecified whether esophagitis present  Can use below for symptoms   Counseled on weight loss  Has follow up with me in 6 weeks; consider h. Pylori testing if symptoms persist despite weight loss   - famotidine (PEPCID) 40 MG tablet; Take 1 tablet (40 mg) by mouth 2 times daily as needed for heartburn    Moderate persistent asthma without complication  Refilled medications; stable   - albuterol (PROAIR HFA/PROVENTIL HFA/VENTOLIN HFA) 108 (90 Base) MCG/ACT inhaler; Inhale 2 puffs into the lungs every 6 hours as needed for shortness of breath / dyspnea or wheezing  - montelukast (SINGULAIR) 10 MG tablet; Take 1 tablet (10 mg) by mouth At Bedtime    Screen for colon cancer  Reminded him of guideline changes; due for exam; referral sent   - Adult Gastro Ref - Procedure Only; Future      30 minutes spent on the date of the encounter doing chart review, history and exam, documentation and further activities per the note       BMI:   Estimated body mass index is 32.13 kg/m  as calculated from the following:    Height as of this encounter: 1.778 m (5' 10\").    Weight as of this encounter: 101.6 kg (223 lb 14.4 oz).   Weight management plan: Discussed healthy diet and exercise guidelines        Return in about 5 weeks (around 6/23/2022) for Preventive Visit.  Patient instructed to return to clinic or contact us sooner if symptoms worsen or new symptoms develop.     Velasquez Negro MD  [facility] [place] Figueroa is a 47 year old who presents for the following health issues     HPI       Asthma symptoms OK  See ACT  Two lumps on back noted by his daughter when he went shirtless several weeks ago; no symptoms  Rare epigastric pain after drinking alcohol or eating spicy foods without weight loss (in fact weight gain has " "occurred) and without dysphagia or odynophagia ; no frequent NSAIDS, no change stools specifically not melena     Review of Systems         Objective    /83 (BP Location: Left arm, Cuff Size: Adult Large)   Pulse 71   Temp 98  F (36.7  C) (Tympanic)   Resp 16   Ht 1.778 m (5' 10\")   Wt 101.6 kg (223 lb 14.4 oz)   SpO2 94%   BMI 32.13 kg/m    Body mass index is 32.13 kg/m .  Physical Exam   GENERAL: healthy, alert and no distress  SKIN: two medium sized lipomata one on mid back and one on right upper back                 "

## 2022-05-26 ENCOUNTER — TELEPHONE (OUTPATIENT)
Dept: GASTROENTEROLOGY | Facility: CLINIC | Age: 47
End: 2022-05-26
Payer: COMMERCIAL

## 2022-05-26 NOTE — TELEPHONE ENCOUNTER
Screening Questions    BlueKIND OF PREP RedLOCATION [review exclusion criteria] GreenSEDATION TYPE      1. Are you active on mychart? N    2. What insurance is in the chart? [employer]      3.   Ordering/Referring Provider: Velasquez Negro MD    4. BMI   (If greater than 40 review exclusion criteria [PAC APPT IF [MAC] @ [facility])  30.7  [If yes, BMI OVER 40-EXTENDED PREP]      **(Sedation review/consideration needed)**  Do you have a legal guardian or Medical Power of    and/or are you able to give consent for your medical care?     SELF     5. Have you had a positive covid test in the last 90 days?   N     6.  Are you currently on dialysis?   N [ If yes, G-PREP & HOSPITAL setting ONLY]     7.  Do you have chronic kidney disease?  N [ If yes, G-PREP ]    8.   Do you have a diagnosis of diabetes?   N   [ If yes, G-PREP ]    9.  On a regular basis do you go 3-5 days between bowel movements?   N   [ If yes, EXTENDED PREP]    10.  Are you taking any prescription pain medications on a routine schedule?    N  [ If yes, EXTENDED PREP] [If yes, MAC]      11.   Do you have any chemical dependencies such as alcohol, street drugs, or methadone?    N [If yes, MAC]    12.   Do you have any history of post-traumatic stress syndrome, severe anxiety or history of psychosis?    N  [If yes, MAC]    13.  [FEMALES] Are you currently pregnant?     If yes, how many weeks?       Respiratory/Heart Screening:  [If yes to any of the following HOSPITAL setting only]     14. Do you have Pulmonary Hypertension [Lungs]?   N       15. Do you have UNCONTROLLED asthma?   N     16.  Do you use daily home oxygen?  N      17. Do you have mod to severe Obstructive Sleep Apnea?         (OKAY @ [facility]  [facility]  SH  PH  RI  MG - if pt is not on OXYGEN)  N      18.   Have you had a heart or lung transplant?   N      19.   Have you had a stroke or Transient ischemic attack (TIA - aka  mini stroke ) within 6 months?  (If yes, please review exclusion  criteria)  N     20.   In the past 6 months, have you had any heart related issues including cardiomyopathy or heart attack?   N           If yes, did it require cardiac stenting or other implantable device?   N      21.   Do you have any implantable devices in your body (pacemaker, defib, LVAD)? (If yes, please review exclusion criteria)  N     22. Do you take nitroglycerin?   N           If yes, how often? N  (if yes, HOSPITAL setting ONLY)    23.  Are you currently taking any blood thinners?    [If yes, INFORM patient to follw up w/ ORDERING PROVIDER FOR BRIDGING INSTRUCTIONS]     N    24.   Do you transfer independently?                (If NO, please HOSPITAL setting ONLY)  Y    25.   Preferred LOCAL Pharmacy for Pre Prescription:         [id number] - [place], [place] - [address]    Scheduling Details  (Please ask for phone number if not scheduled by patient)      Caller : Henry Kennedy    Date of Procedure: 07/11/2022  Surgeon: BETZAIDA   Location: [facility]; [facility] [address] [place] [address]      Sedation Type: CS  l PER PROTOCOL   Conscious Sedation- Needs  for 6 hours after the procedure  MAC/General-Needs  for 24 hours after procedure    N :[Pre-op Required] at [facility]  SH  MG and OR for MAC sedation   (advise patient they will need a pre-op WITH IN 30 DAYS of procedure date)     Type of Procedure Scheduled:   Lower Endoscopy [Colonoscopy]    Which Colonoscopy Prep was Sent?:   [facility]  - PER PROTOCOL     [facility] CF PATIENTS & [facility]S PATIENTS NEEDS EXTENDED PREP       Informed patient they will need an adult  Y  Cannot take any type of public or medical transportation alone    Pre-Procedure Covid test to be completed at [facility] or Externally: Y    Confirmed Nurse will call to complete assessment Y    Additional comments: N

## 2022-06-14 ENCOUNTER — TELEPHONE (OUTPATIENT)
Dept: GASTROENTEROLOGY | Facility: CLINIC | Age: 47
End: 2022-06-14
Payer: COMMERCIAL

## 2022-06-14 NOTE — TELEPHONE ENCOUNTER
Caller: SHERICE    Procedure: COLON    Date, Location, and Surgeon of Procedure Cancelled: 7/14/2022, BETZAIDA     Ordering Provider:    Reason for cancel (please be detailed, any staff messages or encounters to note?): PATIENT GOT [place] FOR JURY DUTY         Rescheduled: YES     If rescheduled:    Date: 7/25   Location:    Prep Resent: Y(changes to prep?)   Covid Test Rescheduled: Y   Note any change or update to original order/sedation: N

## 2022-06-16 ENCOUNTER — OFFICE VISIT (OUTPATIENT)
Dept: SURGERY | Facility: CLINIC | Age: 47
End: 2022-06-16
Attending: INTERNAL MEDICINE
Payer: COMMERCIAL

## 2022-06-16 VITALS — HEART RATE: 81 BPM | SYSTOLIC BLOOD PRESSURE: 120 MMHG | DIASTOLIC BLOOD PRESSURE: 80 MMHG

## 2022-06-16 DIAGNOSIS — D17.30 LIPOMA OF SKIN AND SUBCUTANEOUS TISSUE: ICD-10-CM

## 2022-06-16 PROCEDURE — 99203 OFFICE O/P NEW LOW 30 MIN: CPT | Performed by: SURGERY

## 2022-06-16 NOTE — PROGRESS NOTES
Surgery Consultation, [facility], MULUGETA Ojeda MD, MD    Henry Kennedy MRN# [id number]   YOB: 1975 Age: 47 year old     PCP:  Velasquez Negro [phone]    Chief Complaint: Back lipoma    Pt was seen in consultation from Velasquez Negro.    History of Present Illness:  Henry Kennedy is a 47 year old male who presented with 2 moderately sized masses on the back.  These have been present for years but have been getting slowly larger.  Occasionally cause discomfort when pressure is applied to them.  No history of surgery to the area.  No previous history of sebaceous cyst or lipoma removal.  Patient is here to discuss surgical removal, given interval growth and occasional symptoms.    PMH:  Henry Kennedy  has no past medical history on file.  PSH:  Henry Kennedy  has a past surgical history that includes orthopedic surgery.    Home medications and allergies reviewed.    Social History:  Henry Kennedy  reports that he has never smoked. He has never used smokeless tobacco. He reports previous alcohol use. He reports previous drug use.  Family History:  Henry Kennedy family history includes Aortic stenosis in his father; Coronary Artery Disease in his father; Diabetes Type 2  in his father; Hypothyroidism in his sister.    ROS:  The 10 point Review of Systems is negative other than noted in the HPI.  No fevers or chills.  No recent weight loss or weight gain.    Physical Exam:  Blood pressure 120/80, pulse 81.  0 lbs 0 oz  The appearing gentleman in no distress.  Patient has a pleasant affect and communicates well.   Pupils equal round and reactive to light.   No cervical lymphadenopathy or thyromegaly.   Lung fields clear, breathing comfortably.   Heart normal sinus rhythm.  No murmurs rubs or gallops.  Abdomen soft, nontender, nondistended.  Skin warm, dry.  2 moderately sized subcutaneous masses on the back.  Moderately mobile but fairly deep seeded.  Nontender.        Assessment/plan: Pleasant healthy 47-year-old gentleman with 2 probable back lipomas.  I have explained that these are benign lesions but that they can grow and become increasingly symptomatic.  He has requested surgical excision.  Given the location, I have recommended that this be performed in the operating room with some IV sedation.  We will get this scheduled at the patient's convenience.      Rajeev Ojeda M.D.  [facility]  [phone]    Please route or send letter to:  Primary Care Provider (PCP) and Referring Provider

## 2022-06-22 NOTE — PROGRESS NOTES
Assessment & Plan     Routine general medical examination at a health care facility      Hyperlipidemia with target LDL less than 160  Recheck   - Lipid panel reflex to direct LDL Fasting; Future  - Comprehensive metabolic panel; Future  - CBC with platelets; Future  - TSH; Future  - Lipid panel reflex to direct LDL Fasting  - Comprehensive metabolic panel  - CBC with platelets  - TSH    Screen for colon cancer  Has colonoscopy scheduled     Screening PSA (prostate specific antigen)    - PROSTATE SPEC ANTIGEN SCREEN; Future  - PROSTATE SPEC ANTIGEN SCREEN    Need for hepatitis C screening test    - Hepatitis C Screen Reflex to HCV RNA Quant and Genotype; Future  - Hepatitis C Screen Reflex to HCV RNA Quant and Genotype      20 minutes spent on the date of the encounter doing chart review, history and exam, documentation and further activities per the note           No follow-ups on file.    Velasquez Negro MD  [facility] [place] Figueroa is a 47 year old, presenting for the following health issues:  Physical      Healthy Habits:     Getting at least 3 servings of Calcium per day:  Yes    Bi-annual eye exam:  NO    Dental care twice a year:  Yes    Sleep apnea or symptoms of sleep apnea:  None    Diet:  Regular (no restrictions)    Frequency of exercise:  4-5 days/week    Duration of exercise:  45-60 minutes    Taking medications regularly:  Yes    PHQ-2 Total Score: 0    Additional concerns today:  No     GERD symptoms improved on famotidine     Review of Systems   Constitutional: Negative for chills and fever.   HENT: Negative for congestion, ear pain, hearing loss and sore throat.    Eyes: Negative for pain and visual disturbance.   Respiratory: Negative for cough and shortness of breath.    Cardiovascular: Negative for chest pain, palpitations and peripheral edema.   Gastrointestinal: Negative for abdominal pain, constipation, diarrhea, heartburn, hematochezia and nausea.  "  Genitourinary: Negative for dysuria, frequency, genital sores, hematuria, impotence, penile discharge and urgency.   Musculoskeletal: Negative for arthralgias, joint swelling and myalgias.   Skin: Negative for rash.   Neurological: Negative for dizziness, weakness, headaches and paresthesias.   Psychiatric/Behavioral: Negative for mood changes. The patient is not nervous/anxious.             Objective    /77 (BP Location: Right arm, Patient Position: Chair, Cuff Size: Adult Large)   Pulse 62   Temp 97  F (36.1  C) (Tympanic)   Resp 16   Ht 1.803 m (5' 11\")   Wt 99.3 kg (219 lb)   SpO2 97%   BMI 30.54 kg/m    Body mass index is 30.54 kg/m .  Physical Exam   GENERAL: healthy, alert and no distress  EYES: Eyes grossly normal to inspection, PERRL and conjunctivae and sclerae normal  HENT: ear canals and TM's normal, nose and mouth without ulcers or lesions  NECK: no adenopathy, no asymmetry, masses, or scars and thyroid normal to palpation  RESP: lungs clear to auscultation - no rales, rhonchi or wheezes  CV: regular rate and rhythm, normal S1 S2, no S3 or S4, no murmur, click or rub, no peripheral edema and peripheral pulses strong  ABDOMEN: soft, nontender, no hepatosplenomegaly, no masses and bowel sounds normal  RECTAL: exam declined as we plan to do PSA and he will have colonoscopy in a matter of weeks  MS: no gross musculoskeletal defects noted, no edema  SKIN: no suspicious lesions or rashes  NEURO: Normal strength and tone, mentation intact and speech normal  PSYCH: mentation appears normal, affect normal/bright              .  ..  "

## 2022-06-23 ENCOUNTER — OFFICE VISIT (OUTPATIENT)
Dept: FAMILY MEDICINE | Facility: CLINIC | Age: 47
End: 2022-06-23
Payer: COMMERCIAL

## 2022-06-23 VITALS
WEIGHT: 219 LBS | HEART RATE: 62 BPM | TEMPERATURE: 97 F | OXYGEN SATURATION: 97 % | RESPIRATION RATE: 16 BRPM | BODY MASS INDEX: 30.66 KG/M2 | DIASTOLIC BLOOD PRESSURE: 77 MMHG | HEIGHT: 71 IN | SYSTOLIC BLOOD PRESSURE: 112 MMHG

## 2022-06-23 DIAGNOSIS — Z12.5 SCREENING PSA (PROSTATE SPECIFIC ANTIGEN): ICD-10-CM

## 2022-06-23 DIAGNOSIS — Z00.00 ROUTINE GENERAL MEDICAL EXAMINATION AT A HEALTH CARE FACILITY: Primary | ICD-10-CM

## 2022-06-23 DIAGNOSIS — Z12.11 SCREEN FOR COLON CANCER: ICD-10-CM

## 2022-06-23 DIAGNOSIS — E78.5 HYPERLIPIDEMIA WITH TARGET LDL LESS THAN 160: ICD-10-CM

## 2022-06-23 DIAGNOSIS — Z11.59 NEED FOR HEPATITIS C SCREENING TEST: ICD-10-CM

## 2022-06-23 LAB
ERYTHROCYTE [DISTWIDTH] IN BLOOD BY AUTOMATED COUNT: 12.2 % (ref 10–15)
HCT VFR BLD AUTO: 45.4 % (ref 40–53)
HGB BLD-MCNC: 15.5 G/DL (ref 13.3–17.7)
MCH RBC QN AUTO: 28.8 PG (ref 26.5–33)
MCHC RBC AUTO-ENTMCNC: 34.1 G/DL (ref 31.5–36.5)
MCV RBC AUTO: 84 FL (ref 78–100)
PLATELET # BLD AUTO: 175 10E3/UL (ref 150–450)
RBC # BLD AUTO: 5.38 10E6/UL (ref 4.4–5.9)
WBC # BLD AUTO: 4.6 10E3/UL (ref 4–11)

## 2022-06-23 PROCEDURE — 36415 COLL VENOUS BLD VENIPUNCTURE: CPT | Performed by: INTERNAL MEDICINE

## 2022-06-23 PROCEDURE — 80053 COMPREHEN METABOLIC PANEL: CPT | Performed by: INTERNAL MEDICINE

## 2022-06-23 PROCEDURE — G0103 PSA SCREENING: HCPCS | Performed by: INTERNAL MEDICINE

## 2022-06-23 PROCEDURE — 85027 COMPLETE CBC AUTOMATED: CPT | Performed by: INTERNAL MEDICINE

## 2022-06-23 PROCEDURE — 80061 LIPID PANEL: CPT | Performed by: INTERNAL MEDICINE

## 2022-06-23 PROCEDURE — 84443 ASSAY THYROID STIM HORMONE: CPT | Performed by: INTERNAL MEDICINE

## 2022-06-23 PROCEDURE — 86803 HEPATITIS C AB TEST: CPT | Performed by: INTERNAL MEDICINE

## 2022-06-23 PROCEDURE — 99396 PREV VISIT EST AGE 40-64: CPT | Performed by: INTERNAL MEDICINE

## 2022-06-23 ASSESSMENT — ENCOUNTER SYMPTOMS
EYE PAIN: 0
FREQUENCY: 0
DYSURIA: 0
HEMATURIA: 0
NERVOUS/ANXIOUS: 0
FEVER: 0
JOINT SWELLING: 0
WEAKNESS: 0
PARESTHESIAS: 0
HEARTBURN: 0
DIZZINESS: 0
ABDOMINAL PAIN: 0
SHORTNESS OF BREATH: 0
COUGH: 0
HEARTBURN: 1
HEMATOCHEZIA: 0
DIARRHEA: 0
NAUSEA: 0
ARTHRALGIAS: 0
MYALGIAS: 0
HEADACHES: 0
PALPITATIONS: 0
CONSTIPATION: 0
CHILLS: 0
SORE THROAT: 0

## 2022-06-23 ASSESSMENT — PAIN SCALES - GENERAL: PAINLEVEL: NO PAIN (0)

## 2022-06-23 NOTE — LETTER
"June 27, 2022      Henry Kennedy  [address] [place] [address]        Dear ,    We are writing to inform you of your test results.    The following letter pertains to your most recent diagnostic tests:     -TSH (thyroid stimulating hormone) level is normal which indicates normal circulating thyroid hormone levels.       -Your prostate specific antigen (PSA) test result returned normal.     -Your total cholesterol is 167 which is at your goal of total cholesterol less than 200.     -Your triglycerides are 82 which are at your goal of triglycerides less than 150.     -Your HDL or \"good cholesterol\" is 36 which is below your goal of HDL cholesterol greater than 40.     -Your LDL cholesterol or \"bad cholesterol\" is 115 which is at your goal of LDL cholesterol less than <160.  Your LDL goal is based on your risk factors for artery disease.     -Liver and gallbladder tests are normal for you. (ALT,AST, Alk phos, bilirubin), kidney function is normal for you (Creatinine, GFR), Sodium is normal, Potassium is normal for you, Calcium is normal for you, Glucose (blood sugar) is normal for you.       -Your hepatitis C screening test is negative.  You do not have hepatitis C.     -Your complete blood counts including your hemoglobin returned normal for you.                 Bottom line:  The labs look stable for you.  Increasing exercise can improve HDL (\"good cholesterol\") levels.  A good target to shoot for is 30 minutes of aerobic activity at least 5 days per week.  We can recheck in one year.         Follow up:  Schedule an appointment for a physical examination with fasting blood tests in one year's time, or return sooner if new questions, symptoms or problems arise.           Sincerely,     Dr. Negro     The 10-year ASCVD risk score ([place] KATHY Jr., et al., 2013) is: 2.7%         Values used to calculate the score:       Age: 47 years       Sex: Male       Is Non- : No      "  Diabetic: No       Tobacco smoker: No       Systolic Blood Pressure: 112 mmHg       Is BP treated: No           HDL Cholesterol: 36 mg/dL       Total Cholesterol: 167 mg/dL             Resulted Orders   Hepatitis C Screen Reflex to HCV RNA Quant and Genotype   Result Value Ref Range    Hepatitis C Antibody Nonreactive Nonreactive    Narrative    Assay performance characteristics have not been established for newborns, infants, and children.   Lipid panel reflex to direct LDL Fasting   Result Value Ref Range    Cholesterol 167 <200 mg/dL    Triglycerides 82 <150 mg/dL    Direct Measure HDL 36 (L) >=40 mg/dL    LDL Cholesterol Calculated 115 (H) <=100 mg/dL    Non HDL Cholesterol 131 (H) <130 mg/dL    Patient Fasting > 8hrs? Yes     Narrative    Cholesterol  Desirable:  <200 mg/dL    Triglycerides  Normal:  Less than 150 mg/dL  Borderline High:  150-199 mg/dL  High:  200-499 mg/dL  Very High:  Greater than or equal to 500 mg/dL    Direct Measure HDL  Female:  Greater than or equal to 50 mg/dL   Male:  Greater than or equal to 40 mg/dL    LDL Cholesterol  Desirable:  <100mg/dL  Above Desirable:  100-129 mg/dL   Borderline High:  130-159 mg/dL   High:  160-189 mg/dL   Very High:  >= 190 mg/dL    Non HDL Cholesterol  Desirable:  130 mg/dL  Above Desirable:  130-159 mg/dL  Borderline High:  160-189 mg/dL  High:  190-219 mg/dL  Very High:  Greater than or equal to 220 mg/dL   Comprehensive metabolic panel   Result Value Ref Range    Sodium 139 133 - 144 mmol/L    Potassium 4.1 3.4 - 5.3 mmol/L    Chloride 107 94 - 109 mmol/L    Carbon Dioxide (CO2) 24 20 - 32 mmol/L    Anion Gap 8 3 - 14 mmol/L    Urea Nitrogen 16 7 - 30 mg/dL    Creatinine 0.81 0.66 - 1.25 mg/dL    Calcium 8.8 8.5 - 10.1 mg/dL    Glucose 71 70 - 99 mg/dL    Alkaline Phosphatase 57 40 - 150 U/L    AST 38 0 - 45 U/L    ALT 64 0 - 70 U/L    Protein Total 7.6 6.8 - 8.8 g/dL    Albumin 4.1 3.4 - 5.0 g/dL    Bilirubin Total 1.0 0.2 - 1.3 mg/dL    GFR Estimate  >90 >60 mL/min/1.73m2      Comment:      Effective December 21, 2021 eGFRcr in adults is calculated using the 2021 CKD-EPI creatinine equation which includes age and gender (Ember et al., [place], DOI: 10.1056/ORNHab3777184)   CBC with platelets   Result Value Ref Range    WBC Count 4.6 4.0 - 11.0 10e3/uL    RBC Count 5.38 4.40 - 5.90 10e6/uL    Hemoglobin 15.5 13.3 - 17.7 g/dL    Hematocrit 45.4 40.0 - 53.0 %    MCV 84 78 - 100 fL    MCH 28.8 26.5 - 33.0 pg    MCHC 34.1 31.5 - 36.5 g/dL    RDW 12.2 10.0 - 15.0 %    Platelet Count 175 150 - 450 10e3/uL   PROSTATE SPEC ANTIGEN SCREEN   Result Value Ref Range    Prostate Specific Antigen Screen 0.52 0.00 - 4.00 ug/L   TSH   Result Value Ref Range    TSH 1.78 0.40 - 4.00 mU/L       If you have any questions or concerns, please call the clinic at the number listed above.       Sincerely,      Velasquez Negro MD

## 2022-06-24 LAB — HCV AB SERPL QL IA: NONREACTIVE

## 2022-06-26 LAB
ALBUMIN SERPL-MCNC: 4.1 G/DL (ref 3.4–5)
ALP SERPL-CCNC: 57 U/L (ref 40–150)
ALT SERPL W P-5'-P-CCNC: 64 U/L (ref 0–70)
ANION GAP SERPL CALCULATED.3IONS-SCNC: 8 MMOL/L (ref 3–14)
AST SERPL W P-5'-P-CCNC: 38 U/L (ref 0–45)
BILIRUB SERPL-MCNC: 1 MG/DL (ref 0.2–1.3)
BUN SERPL-MCNC: 16 MG/DL (ref 7–30)
CALCIUM SERPL-MCNC: 8.8 MG/DL (ref 8.5–10.1)
CHLORIDE BLD-SCNC: 107 MMOL/L (ref 94–109)
CHOLEST SERPL-MCNC: 167 MG/DL
CO2 SERPL-SCNC: 24 MMOL/L (ref 20–32)
CREAT SERPL-MCNC: 0.81 MG/DL (ref 0.66–1.25)
FASTING STATUS PATIENT QL REPORTED: YES
GFR SERPL CREATININE-BSD FRML MDRD: >90 ML/MIN/1.73M2
GLUCOSE BLD-MCNC: 71 MG/DL (ref 70–99)
HDLC SERPL-MCNC: 36 MG/DL
LDLC SERPL CALC-MCNC: 115 MG/DL
NONHDLC SERPL-MCNC: 131 MG/DL
POTASSIUM BLD-SCNC: 4.1 MMOL/L (ref 3.4–5.3)
PROT SERPL-MCNC: 7.6 G/DL (ref 6.8–8.8)
PSA SERPL-MCNC: 0.52 UG/L (ref 0–4)
SODIUM SERPL-SCNC: 139 MMOL/L (ref 133–144)
TRIGL SERPL-MCNC: 82 MG/DL
TSH SERPL DL<=0.005 MIU/L-ACNC: 1.78 MU/L (ref 0.4–4)

## 2022-06-26 NOTE — RESULT ENCOUNTER NOTE
"The following letter pertains to your most recent diagnostic tests:    -TSH (thyroid stimulating hormone) level is normal which indicates normal circulating thyroid hormone levels.      -Your prostate specific antigen (PSA) test result returned normal.     -Your total cholesterol is 167 which is at your goal of total cholesterol less than 200.    -Your triglycerides are 82 which are at your goal of triglycerides less than 150.    -Your HDL or \"good cholesterol\" is 36 which is below your goal of HDL cholesterol greater than 40.    -Your LDL cholesterol or \"bad cholesterol\" is 115 which is at your goal of LDL cholesterol less than <160.  Your LDL goal is based on your risk factors for artery disease.    -Liver and gallbladder tests are normal for you. (ALT,AST, Alk phos, bilirubin), kidney function is normal for you (Creatinine, GFR), Sodium is normal, Potassium is normal for you, Calcium is normal for you, Glucose (blood sugar) is normal for you.      -Your hepatitis C screening test is negative.  You do not have hepatitis C.     -Your complete blood counts including your hemoglobin returned normal for you.             Bottom line:  The labs look stable for you.  Increasing exercise can improve HDL (\"good cholesterol\") levels.  A good target to shoot for is 30 minutes of aerobic activity at least 5 days per week.  We can recheck in one year.        Follow up:  Schedule an appointment for a physical examination with fasting blood tests in one year's time, or return sooner if new questions, symptoms or problems arise.        Sincerely,    Dr. Negro    The 10-year ASCVD risk score (Natividad CHAVEZ Jr., et al., 2013) is: 2.7%    Values used to calculate the score:      Age: 47 years      Sex: Male      Is Non- : No      Diabetic: No      Tobacco smoker: No      Systolic Blood Pressure: 112 mmHg      Is BP treated: No      HDL Cholesterol: 36 mg/dL      Total Cholesterol: 167 mg/dL "

## 2022-07-08 ENCOUNTER — TELEPHONE (OUTPATIENT)
Dept: SURGERY | Facility: CLINIC | Age: 47
End: 2022-07-08

## 2022-07-08 NOTE — TELEPHONE ENCOUNTER
Type of surgery: excise back lipoma x2  Location of surgery: [facility]  Date and time of surgery: 8/12/22 7:30am  Surgeon: Dr Ojeda  Pre-Op Appt Date: pt to schedule  Post-Op Appt Date: pt to schedule   Packet sent out: Yes  Pre-cert/Authorization completed:  Not Applicable  Date: 6/24/22

## 2022-07-25 ENCOUNTER — HOSPITAL ENCOUNTER (OUTPATIENT)
Facility: CLINIC | Age: 47
Discharge: HOME | End: 2022-07-25
Attending: INTERNAL MEDICINE | Admitting: INTERNAL MEDICINE
Payer: COMMERCIAL

## 2022-07-25 VITALS
TEMPERATURE: 97.6 F | WEIGHT: 210 LBS | DIASTOLIC BLOOD PRESSURE: 80 MMHG | OXYGEN SATURATION: 94 % | RESPIRATION RATE: 20 BRPM | SYSTOLIC BLOOD PRESSURE: 112 MMHG | BODY MASS INDEX: 30.06 KG/M2 | HEART RATE: 96 BPM | HEIGHT: 70 IN

## 2022-07-25 LAB — COLONOSCOPY: NORMAL

## 2022-07-25 PROCEDURE — 88305 TISSUE EXAM BY PATHOLOGIST: CPT | Mod: TC | Performed by: INTERNAL MEDICINE

## 2022-07-25 PROCEDURE — 45380 COLONOSCOPY AND BIOPSY: CPT | Mod: PT | Performed by: INTERNAL MEDICINE

## 2022-07-25 PROCEDURE — 250N000011 HC RX IP 250 OP 636: Performed by: INTERNAL MEDICINE

## 2022-07-25 PROCEDURE — 999N000099 HC STATISTIC MODERATE SEDATION < 10 MIN: Performed by: INTERNAL MEDICINE

## 2022-07-25 PROCEDURE — G0500 MOD SEDAT ENDO SERVICE >5YRS: HCPCS | Performed by: INTERNAL MEDICINE

## 2022-07-25 RX ORDER — FENTANYL CITRATE 50 UG/ML
INJECTION, SOLUTION INTRAMUSCULAR; INTRAVENOUS PRN
Status: DISCONTINUED | OUTPATIENT
Start: 2022-07-25 | End: 2022-07-25 | Stop reason: HOSPADM

## 2022-07-25 RX ADMIN — MIDAZOLAM 2 MG: 1 INJECTION INTRAMUSCULAR; INTRAVENOUS at 14:39

## 2022-07-25 RX ADMIN — FENTANYL CITRATE 100 MCG: 50 INJECTION, SOLUTION INTRAMUSCULAR; INTRAVENOUS at 14:36

## 2022-07-25 RX ADMIN — MIDAZOLAM 2 MG: 1 INJECTION INTRAMUSCULAR; INTRAVENOUS at 14:36

## 2022-07-25 NOTE — H&P
[facility]  Pre-Endoscopy History and Physical     Henry Kennedy MRN# [id number]   YOB: 1975 Age: 47 year old     Date of Procedure: 7/25/2022  Primary care provider: Velasquez Negro  Type of Endoscopy: colonoscopy  Reason for Procedure: Screening  Type of Anesthesia Anticipated: Moderate Sedation    HPI:    Henry is a 47 year old male who will be undergoing the above procedure.      A history and physical has been performed. The patient's medications and allergies have been reviewed. The risks and benefits of the procedure and the sedation options and risks were discussed with the patient.  All questions were answered and informed consent was obtained.      He denies a personal or family history of anesthesia complications or bleeding disorders.     Allergies   Allergen Reactions     Beer      Cats      Grass      Peanuts [Nuts]      Seasonal Allergies         Prior to Admission Medications   Prescriptions Last Dose Informant Patient Reported? Taking?   albuterol (PROAIR HFA/PROVENTIL HFA/VENTOLIN HFA) 108 (90 Base) MCG/ACT inhaler Past Month at Unknown time  No Yes   Sig: Inhale 2 puffs into the lungs every 6 hours as needed for shortness of breath / dyspnea or wheezing   famotidine (PEPCID) 40 MG tablet Past Week at Unknown time  No Yes   Sig: Take 1 tablet (40 mg) by mouth 2 times daily as needed for heartburn   montelukast (SINGULAIR) 10 MG tablet More than a month at Unknown time  No Yes   Sig: Take 1 tablet (10 mg) by mouth At Bedtime      Facility-Administered Medications: None       Patient Active Problem List   Diagnosis     Lipoma of skin and subcutaneous tissue        No past medical history on file.     Past Surgical History:   Procedure Laterality Date     ORTHOPEDIC SURGERY      thumb fracture repair        Social History     Tobacco Use     Smoking status: Never Smoker     Smokeless tobacco: Never Used   Substance Use Topics     Alcohol use: Not Currently  "      Family History   Problem Relation Age of Onset     Aortic stenosis Father      Coronary Artery Disease Father      Diabetes Type 2  Father      Hypothyroidism Sister        REVIEW OF SYSTEMS:     5 point ROS negative except as noted above in HPI, including Gen., Resp., CV, GI &  system review.      PHYSICAL EXAM:   /80   Pulse 96   Temp 97.6  F (36.4  C) (Temporal)   Resp 20   Ht 1.778 m (5' 10\")   Wt 95.3 kg (210 lb)   SpO2 94%   BMI 30.13 kg/m   Estimated body mass index is 30.13 kg/m  as calculated from the following:    Height as of this encounter: 1.778 m (5' 10\").    Weight as of this encounter: 95.3 kg (210 lb).   GENERAL APPEARANCE: healthy, alert and no distress  MENTAL STATUS: alert  AIRWAY EXAM: Mallampatti Class I (visualization of the soft palate, fauces, uvula, anterior and posterior pillars)  RESP: lungs clear to auscultation - no rales, rhonchi or wheezes  CV: normal S1 S2, no S3 or S4      DIAGNOSTICS:    Not indicated      IMPRESSION   ASA Class 2 - Mild systemic disease        PLAN:       Plan for colonoscopy. We discussed the risks, benefits and alternatives and the patient wished to proceed.    The above has been forwarded to the consulting provider.      Signed Electronically by: Ronal Casey MD,MD  July 25, 2022      [facility]  Ph: [phone] Fax: [phone]    "

## 2022-07-26 LAB
PATH REPORT.COMMENTS IMP SPEC: NORMAL
PATH REPORT.COMMENTS IMP SPEC: NORMAL
PATH REPORT.FINAL DX SPEC: NORMAL
PATH REPORT.GROSS SPEC: NORMAL
PATH REPORT.MICROSCOPIC SPEC OTHER STN: NORMAL
PATH REPORT.RELEVANT HX SPEC: NORMAL
PHOTO IMAGE: NORMAL

## 2022-07-26 PROCEDURE — 88305 TISSUE EXAM BY PATHOLOGIST: CPT | Mod: 26 | Performed by: PATHOLOGY

## 2022-09-08 ENCOUNTER — ANESTHESIA EVENT (OUTPATIENT)
Dept: SURGERY | Facility: CLINIC | Age: 47
End: 2022-09-08
Payer: COMMERCIAL

## 2022-09-09 ENCOUNTER — ANESTHESIA (OUTPATIENT)
Dept: SURGERY | Facility: CLINIC | Age: 47
End: 2022-09-09
Payer: COMMERCIAL

## 2022-09-09 ENCOUNTER — HOSPITAL ENCOUNTER (OUTPATIENT)
Facility: CLINIC | Age: 47
Discharge: HOME | End: 2022-09-09
Attending: SURGERY | Admitting: SURGERY
Payer: COMMERCIAL

## 2022-09-09 VITALS
OXYGEN SATURATION: 97 % | HEIGHT: 71 IN | HEART RATE: 55 BPM | SYSTOLIC BLOOD PRESSURE: 122 MMHG | WEIGHT: 206.1 LBS | DIASTOLIC BLOOD PRESSURE: 73 MMHG | BODY MASS INDEX: 28.85 KG/M2 | RESPIRATION RATE: 10 BRPM | TEMPERATURE: 97.4 F

## 2022-09-09 DIAGNOSIS — D17.30 LIPOMA OF SKIN AND SUBCUTANEOUS TISSUE: ICD-10-CM

## 2022-09-09 PROCEDURE — 250N000011 HC RX IP 250 OP 636: Performed by: ANESTHESIOLOGY

## 2022-09-09 PROCEDURE — 370N000017 HC ANESTHESIA TECHNICAL FEE, PER MIN: Performed by: SURGERY

## 2022-09-09 PROCEDURE — 999N000141 HC STATISTIC PRE-PROCEDURE NURSING ASSESSMENT: Performed by: SURGERY

## 2022-09-09 PROCEDURE — 88304 TISSUE EXAM BY PATHOLOGIST: CPT | Mod: 26 | Performed by: PATHOLOGY

## 2022-09-09 PROCEDURE — 250N000009 HC RX 250: Performed by: SURGERY

## 2022-09-09 PROCEDURE — 272N000001 HC OR GENERAL SUPPLY STERILE: Performed by: SURGERY

## 2022-09-09 PROCEDURE — 21931 EXC BACK LES SC 3 CM/>: CPT | Mod: 59 | Performed by: SURGERY

## 2022-09-09 PROCEDURE — 360N000075 HC SURGERY LEVEL 2, PER MIN: Performed by: SURGERY

## 2022-09-09 PROCEDURE — 710N000012 HC RECOVERY PHASE 2, PER MINUTE: Performed by: SURGERY

## 2022-09-09 PROCEDURE — 88304 TISSUE EXAM BY PATHOLOGIST: CPT | Mod: TC | Performed by: SURGERY

## 2022-09-09 PROCEDURE — 710N000009 HC RECOVERY PHASE 1, LEVEL 1, PER MIN: Performed by: SURGERY

## 2022-09-09 PROCEDURE — 21933 EXC BACK TUM DEEP 5 CM/>: CPT | Mod: 51 | Performed by: SURGERY

## 2022-09-09 PROCEDURE — 250N000009 HC RX 250: Performed by: ANESTHESIOLOGY

## 2022-09-09 PROCEDURE — 258N000003 HC RX IP 258 OP 636: Performed by: ANESTHESIOLOGY

## 2022-09-09 RX ORDER — ONDANSETRON 4 MG/1
4 TABLET, ORALLY DISINTEGRATING ORAL EVERY 30 MIN PRN
Status: DISCONTINUED | OUTPATIENT
Start: 2022-09-09 | End: 2022-09-09 | Stop reason: HOSPADM

## 2022-09-09 RX ORDER — HYDROCODONE BITARTRATE AND ACETAMINOPHEN 5; 325 MG/1; MG/1
1 TABLET ORAL
Status: DISCONTINUED | OUTPATIENT
Start: 2022-09-09 | End: 2022-09-09 | Stop reason: HOSPADM

## 2022-09-09 RX ORDER — PROPOFOL 10 MG/ML
INJECTION, EMULSION INTRAVENOUS CONTINUOUS PRN
Status: DISCONTINUED | OUTPATIENT
Start: 2022-09-09 | End: 2022-09-09

## 2022-09-09 RX ORDER — ONDANSETRON 2 MG/ML
4 INJECTION INTRAMUSCULAR; INTRAVENOUS EVERY 30 MIN PRN
Status: DISCONTINUED | OUTPATIENT
Start: 2022-09-09 | End: 2022-09-09 | Stop reason: HOSPADM

## 2022-09-09 RX ORDER — HYDROMORPHONE HCL IN WATER/PF 6 MG/30 ML
0.2 PATIENT CONTROLLED ANALGESIA SYRINGE INTRAVENOUS EVERY 5 MIN PRN
Status: DISCONTINUED | OUTPATIENT
Start: 2022-09-09 | End: 2022-09-09 | Stop reason: HOSPADM

## 2022-09-09 RX ORDER — FENTANYL CITRATE 0.05 MG/ML
25 INJECTION, SOLUTION INTRAMUSCULAR; INTRAVENOUS
Status: DISCONTINUED | OUTPATIENT
Start: 2022-09-09 | End: 2022-09-09 | Stop reason: HOSPADM

## 2022-09-09 RX ORDER — FENTANYL CITRATE 50 UG/ML
INJECTION, SOLUTION INTRAMUSCULAR; INTRAVENOUS PRN
Status: DISCONTINUED | OUTPATIENT
Start: 2022-09-09 | End: 2022-09-09

## 2022-09-09 RX ORDER — MEPERIDINE HYDROCHLORIDE 25 MG/ML
12.5 INJECTION INTRAMUSCULAR; INTRAVENOUS; SUBCUTANEOUS
Status: DISCONTINUED | OUTPATIENT
Start: 2022-09-09 | End: 2022-09-09 | Stop reason: HOSPADM

## 2022-09-09 RX ORDER — FENTANYL CITRATE 0.05 MG/ML
50 INJECTION, SOLUTION INTRAMUSCULAR; INTRAVENOUS EVERY 5 MIN PRN
Status: DISCONTINUED | OUTPATIENT
Start: 2022-09-09 | End: 2022-09-09 | Stop reason: HOSPADM

## 2022-09-09 RX ORDER — SODIUM CHLORIDE, SODIUM LACTATE, POTASSIUM CHLORIDE, CALCIUM CHLORIDE 600; 310; 30; 20 MG/100ML; MG/100ML; MG/100ML; MG/100ML
INJECTION, SOLUTION INTRAVENOUS CONTINUOUS
Status: DISCONTINUED | OUTPATIENT
Start: 2022-09-09 | End: 2022-09-09 | Stop reason: HOSPADM

## 2022-09-09 RX ORDER — LIDOCAINE HYDROCHLORIDE 10 MG/ML
INJECTION, SOLUTION INFILTRATION; PERINEURAL
Status: DISCONTINUED
Start: 2022-09-09 | End: 2022-09-09 | Stop reason: HOSPADM

## 2022-09-09 RX ORDER — LIDOCAINE HYDROCHLORIDE 20 MG/ML
INJECTION, SOLUTION INFILTRATION; PERINEURAL PRN
Status: DISCONTINUED | OUTPATIENT
Start: 2022-09-09 | End: 2022-09-09

## 2022-09-09 RX ORDER — HYDROCODONE BITARTRATE AND ACETAMINOPHEN 5; 325 MG/1; MG/1
1-2 TABLET ORAL EVERY 4 HOURS PRN
Qty: 10 TABLET | Refills: 0 | Status: SHIPPED | OUTPATIENT
Start: 2022-09-09

## 2022-09-09 RX ORDER — BUPIVACAINE HYDROCHLORIDE AND EPINEPHRINE 5; 5 MG/ML; UG/ML
INJECTION, SOLUTION EPIDURAL; INTRACAUDAL; PERINEURAL
Status: DISCONTINUED
Start: 2022-09-09 | End: 2022-09-09 | Stop reason: HOSPADM

## 2022-09-09 RX ORDER — OXYCODONE HYDROCHLORIDE 5 MG/1
5 TABLET ORAL EVERY 4 HOURS PRN
Status: DISCONTINUED | OUTPATIENT
Start: 2022-09-09 | End: 2022-09-09 | Stop reason: HOSPADM

## 2022-09-09 RX ADMIN — LIDOCAINE HYDROCHLORIDE 40 MG: 20 INJECTION, SOLUTION INFILTRATION; PERINEURAL at 10:42

## 2022-09-09 RX ADMIN — FENTANYL CITRATE 50 MCG: 50 INJECTION, SOLUTION INTRAMUSCULAR; INTRAVENOUS at 11:13

## 2022-09-09 RX ADMIN — PROPOFOL 300 MCG/KG/MIN: 10 INJECTION, EMULSION INTRAVENOUS at 10:42

## 2022-09-09 RX ADMIN — SODIUM CHLORIDE, POTASSIUM CHLORIDE, SODIUM LACTATE AND CALCIUM CHLORIDE: 600; 310; 30; 20 INJECTION, SOLUTION INTRAVENOUS at 10:06

## 2022-09-09 RX ADMIN — FENTANYL CITRATE 50 MCG: 50 INJECTION, SOLUTION INTRAMUSCULAR; INTRAVENOUS at 10:48

## 2022-09-09 ASSESSMENT — ACTIVITIES OF DAILY LIVING (ADL): ADLS_ACUITY_SCORE: 35

## 2022-09-09 ASSESSMENT — LIFESTYLE VARIABLES: TOBACCO_USE: 0

## 2022-09-09 NOTE — DISCHARGE INSTRUCTIONS
[facility]  Discharge Instructions: Post-Operative Excision of Mass or Lesion    ACTIVITY  Take frequent, short walks and increase your activity gradually.    Avoid strenuous physical activity or heavy lifting greater than 15-20 lbs. for 1-2 weeks.  You may climb stairs.  You may drive without restrictions when you are not using any prescription pain medication and feel comfortable in a car.  You may return to work/school when you are comfortable without any prescription pain medication.    WOUND CARE  You may remove your outer dressing or Band-Aids and shower 48 hours after the surgery.  Pat your incisions dry and leave them open to air.  Re-apply dressing (Band-Aids or gauze/tape) as needed for comfort or drainage.  You may have steri-strips (looks like white tape) or skin glue on your incisions.  You may peel off the steri-strips 2 weeks after your surgery if they have not peeled off on their own.  If you have skin glue, it will peel up and fall off on its own.  Do not soak your incisions in a tub or pool for 2 weeks.   Do not apply any lotions, creams, or ointments to your incision(s).  A ridge under your incision(s) is normal and will gradually resolve.    DIET  Start with liquids, then gradually resume your regular diet as tolerated.   Drink plenty of liquids to stay hydrated.    PAIN  Expect some tenderness and discomfort at the incision site(s).  Use the prescribed pain medication at your discretion.  Expect gradual resolution of your pain over several days.  You may take ibuprofen with food (unless you have been told not to) or acetaminophen/Tylenol instead of or in addition to your prescribed pain medication.  However, if you are taking Norco or Percocet, do not take any additional acetaminophen/Tylenol.  Do not drink alcohol or drive while you are taking pain medications.  You may apply ice to your incisions in 20 minute intervals as needed for the next 48 hours.  After that  time, consider switching to heat if you prefer.    EXPECTATIONS  Pain medications can cause constipation.  Limit use when possible.  Take an over the counter or prescribed stool softener/stimulant, such as Colace or Senna, 1-2 times a day with plenty of water.  You may take a mild over the counter laxative, such as Miralax or a suppository, as needed.    You may discontinue these medications once you are having regular bowel movements and/or are no longer taking your narcotic pain medication.    FOLLOW UP  Our office will contact you in approximately 2-3 weeks to check on your progress and answer any questions you may have.  If you are doing well, you will not need to return for a follow up appointment.  If any concerns are identified over the phone, we will help you make an appointment to see a provider.   If you have not received a phone call, have any questions or concerns, or would like to be seen, please call us at [phone] and ask to speak with our nurse.  We are located at [address] [place] [address].    CALL OUR OFFICE [phone] IF YOU HAVE:   Chills or fever above 101 F.  Increased redness, warmth, or drainage at your incisions.  Significant bleeding.  Pain not relieved by your pain medication or rest.  Increasing pain after the first 48 hours.  Any other concerns or questions.           Revised December 2021     Same Day Surgery Discharge Instructions for  Sedation and General Anesthesia     It's not unusual to feel dizzy, light-headed or faint for up to 24 hours after surgery or while taking pain medication.  If you have these symptoms: sit for a few minutes before standing and have someone assist you when you get up to walk or use the bathroom.    You should rest and relax for the next 24 hours. We recommend you make arrangements to have an adult stay with you for at least 24 hours after your discharge.  Avoid hazardous and strenuous activity.    DO NOT DRIVE any vehicle  or operate mechanical equipment for 24 hours following the end of your surgery.  Even though you may feel normal, your reactions may be affected by the medication you have received.    Do not drink alcoholic beverages for 24 hours following surgery.     Slowly progress to your regular diet as you feel able. It's not unusual to feel nauseated and/or vomit after receiving anesthesia.  If you develop these symptoms, drink clear liquids (apple juice, ginger ale, broth, 7-up, etc. ) until you feel better.  If your nausea and vomiting persists for 24 hours, please notify your surgeon.      All narcotic pain medications, along with inactivity and anesthesia, can cause constipation. Drinking plenty of liquids and increasing fiber intake will help.    For any questions of a medical nature, call your surgeon.    Do not make important decisions for 24 hours.    If you had general anesthesia, you may have a sore throat for a couple of days related to the breathing tube used during surgery.  You may use Cepacol lozenges to help with this discomfort.  If it worsens or if you develop a fever, contact your surgeon.     If you feel your pain is not well managed with the pain medications prescribed by your surgeon, please contact your surgeon's office to let them know so they can address your concerns.      **If you have concerns or questions about your procedure,    please contact Dr Ojeda at  [phone]**

## 2022-09-09 NOTE — OP NOTE
General Surgery Operative Note    PREOPERATIVE DIAGNOSIS:  Lipoma of skin and subcutaneous tissue [D17.30]    POSTOPERATIVE DIAGNOSIS:  Back Lipomas x 2    PROCEDURE:    Excise back lipoma times 2    ANESTHESIA: MAC    PREOPERATIVE MEDICATIONS: None    SURGEON:  Seth Ojeda MD    ASSISTANT:  Torrey Hall PA-C.  Assistant was required owing to challenging exposure and need for retraction.    INDICATIONS: Patient presented with 2 symptomatic masses along the back    PROCEDURE:  The patient was taken to the operating suite and was given IV sedation.  He was placed prone and the back was prepped and draped in a sterile fashion.  We began with a 3 cm incision just above the right scapula.  This was taken down through skin and subcutaneous tissue.  We encountered a fatty lobulated mass which was densely adhered to the underlying musculature.  We were able to remove it completely with cautery and hemostasis was obtained.  The mass removed was approximately 3 x 4 cm.  This wound was closed in layers with 3-0 Vicryl and 4-0 Monocryl.  We then turned our attention toward the lower off midline mass.  We made a longitudinal incision and took this down through skin and subcutaneous tissue.  This mass was actually intramuscular but was able to be removed in its entirety.  This was a fatty mass and cautery was used to  from the surrounding muscle.  This wound was also closed with 2 oh, 3 oh, and 4-0 Vicryl.  Steri-Strips were applied and the wounds were dressed.  The patient was uneventfully awakened and taken to the PACU in stable condition.  At the conclusion of the case, all lap and needle counts were correct.      ESTIMATED BLOOD LOSS: 5 mL      INTRAOPERATIVE FINDINGS: Fatty intramuscular masses consistent with deep lipoma    Seth Ojeda MD, MD

## 2022-09-09 NOTE — BRIEF OP NOTE
[facility]    Brief Operative Note    Pre-operative diagnosis: Lipoma of skin and subcutaneous tissue [D17.30]  Post-operative diagnosis Back Lipomas x 2    Procedure: Procedure(s):  Excise back lipoma times 2     Surgeon: Surgeon(s) and Role:     * Seth Ojeda MD - Primary     * Torrey Hall PA-C - Assisting     Anesthesia: MAC with Regional   Estimated Blood Loss: 5 mL from 9/9/2022 10:39 AM to 9/9/2022 11:32 AM      Drains: None  Specimens:   ID Type Source Tests Collected by Time Destination   1 : RIGHT UPPER BACK LESION Tissue Back, Upper SURGICAL PATHOLOGY EXAM Seth Ojeda MD 9/9/2022 11:01 AM    2 : right lower back mass Tissue Back, Lower SURGICAL PATHOLOGY EXAM Seth Ojeda MD 9/9/2022 11:14 AM      Findings:   None.  Both appear grossly lipomatous.  Sent for pathology.  Complications: None.  Implants: * No implants in log *

## 2022-09-09 NOTE — ANESTHESIA PREPROCEDURE EVALUATION
Anesthesia Pre-Procedure Evaluation    Patient: Henry Kennedy   MRN: [id number] : 1975        Procedure : Procedure(s):  Excise back lipoma times 2          No past medical history on file.   Past Surgical History:   Procedure Laterality Date     COLONOSCOPY N/A 2022    Procedure: COLONOSCOPY, WITH POLYPECTOMY AND BIOPSY;  Surgeon: Ronal Casey MD;  Location:  GI     ORTHOPEDIC SURGERY      thumb fracture repair       Allergies   Allergen Reactions     Beer      Cats      Grass      Peanuts [Nuts]      Seasonal Allergies       Social History     Tobacco Use     Smoking status: Never Smoker     Smokeless tobacco: Never Used   Substance Use Topics     Alcohol use: Not Currently      Wt Readings from Last 1 Encounters:   22 93.5 kg (206 lb 1.6 oz)        Anesthesia Evaluation   Pt has had prior anesthetic.     No history of anesthetic complications       ROS/MED HX  ENT/Pulmonary:    (-) tobacco use, asthma and sleep apnea   Neurologic:       Cardiovascular:     (+) Dyslipidemia -----    METS/Exercise Tolerance:     Hematologic:       Musculoskeletal:       GI/Hepatic:    (-) GERD   Renal/Genitourinary:       Endo:    (-) Type II DM and thyroid disease   Psychiatric/Substance Use:       Infectious Disease:       Malignancy:       Other:            Physical Exam    Airway        Mallampati: II   TM distance: > 3 FB   Neck ROM: full   Mouth opening: > 3 cm    Respiratory Devices and Support         Dental  no notable dental history         Cardiovascular   cardiovascular exam normal          Pulmonary   pulmonary exam normal                OUTSIDE LABS:  CBC:   Lab Results   Component Value Date    WBC 4.6 2022    WBC 5.0 2019    HGB 15.5 2022    HGB 16.8 2019    HCT 45.4 2022    HCT 47.9 2019     2022     2019     BMP:   Lab Results   Component Value Date     2022     2019    POTASSIUM 4.1 2022     POTASSIUM 4.4 07/29/2019    CHLORIDE 107 06/23/2022    CHLORIDE 108 07/29/2019    CO2 24 06/23/2022    CO2 24 07/29/2019    BUN 16 06/23/2022    BUN 18 07/29/2019    CR 0.81 06/23/2022    CR 0.81 07/29/2019    GLC 71 06/23/2022    GLC 81 07/29/2019     COAGS: No results found for: PTT, INR, FIBR  POC: No results found for: BGM, HCG, HCGS  HEPATIC:   Lab Results   Component Value Date    ALBUMIN 4.1 06/23/2022    PROTTOTAL 7.6 06/23/2022    ALT 64 06/23/2022    AST 38 06/23/2022    ALKPHOS 57 06/23/2022    BILITOTAL 1.0 06/23/2022     OTHER:   Lab Results   Component Value Date    NENO 8.8 06/23/2022    TSH 1.78 06/23/2022       Anesthesia Plan    ASA Status:  2   - Procedure: Procedure only, no anesthetic delivered      Anesthesia Type: MAC.              Consents    Anesthesia Plan(s) and associated risks, benefits, and realistic alternatives discussed. Questions answered and patient/representative(s) expressed understanding.    - Discussed:     - Discussed with:  Patient         Postoperative Care    Pain management: IV analgesics, Oral pain medications.   PONV prophylaxis: Ondansetron (or other 5HT-3), Dexamethasone or Solumedrol     Comments:           H&P reviewed: Unable to attach H&P to encounter due to EHR limitations. H&P Update: appropriate H&P reviewed, patient examined. No interval changes since H&P (within 30 days).         Nicole Francis

## 2022-09-09 NOTE — ANESTHESIA POSTPROCEDURE EVALUATION
Patient: Henry Kennedy    Procedure: Procedure(s):  Excise back lipoma times 2       Anesthesia Type:  MAC    Note:  Disposition: Outpatient   Postop Pain Control: Uneventful            Sign Out: Well controlled pain   PONV: No   Neuro/Psych: Uneventful            Sign Out: Acceptable/Baseline neuro status   Airway/Respiratory: Uneventful            Sign Out: Acceptable/Baseline resp. status   CV/Hemodynamics: Uneventful            Sign Out: Acceptable CV status; No obvious hypovolemia; No obvious fluid overload   Other NRE: NONE   DID A NON-ROUTINE EVENT OCCUR?            Last vitals:  Vitals Value Taken Time   /72 09/09/22 1200   Temp 36.7  C (98  F) 09/09/22 1200   Pulse 66 09/09/22 1212   Resp 14 09/09/22 1212   SpO2 98 % 09/09/22 1212   Vitals shown include unvalidated device data.    Electronically Signed By: Nicole Francis  September 9, 2022  3:19 PM

## 2022-09-09 NOTE — OR NURSING
Patient brought a picture of home covid antigen test on phone as directed.  I personally saw this result and it was time stamped 9/8/2022.  Result was negative.

## 2022-09-27 ENCOUNTER — TELEPHONE (OUTPATIENT)
Dept: SURGERY | Facility: CLINIC | Age: 47
End: 2022-09-27

## 2022-09-27 NOTE — TELEPHONE ENCOUNTER
[facility]  Post op call note - No Answer    Henry Marcia was called for an update regarding his recovery.  There was no answer and a message was left instructing the patient to call the office with any questions or concerns.     Torrey Hall PA-C

## 2023-05-24 ENCOUNTER — PATIENT OUTREACH (OUTPATIENT)
Dept: CARE COORDINATION | Facility: CLINIC | Age: 48
End: 2023-05-24
Payer: COMMERCIAL

## 2023-06-08 ENCOUNTER — PATIENT OUTREACH (OUTPATIENT)
Dept: CARE COORDINATION | Facility: CLINIC | Age: 48
End: 2023-06-08
Payer: COMMERCIAL

## (undated) DEVICE — LINEN TOWEL PACK X5 5464

## (undated) DEVICE — PACK MINOR SBA15MIFSE

## (undated) DEVICE — SU VICRYL 3-0 SH 27" J316H

## (undated) DEVICE — SU MONOCRYL 4-0 PS-2 18" UND Y496G

## (undated) DEVICE — PREP CHLORAPREP 26ML TINTED HI-LITE ORANGE 930815

## (undated) DEVICE — DRSG STERI STRIP 1/2X4" R1547

## (undated) DEVICE — DRAPE LAP W/ARMBOARD 29410

## (undated) DEVICE — GLOVE PROTEXIS BLUE W/NEU-THERA 7.5  2D73EB75

## (undated) DEVICE — SOL WATER IRRIG 1000ML BOTTLE 2F7114

## (undated) DEVICE — ESU ELEC BLADE NN-STCK PLUMEPEN PRO 360D 10FT PLPRO4020

## (undated) DEVICE — NDL 19GA 1.5"

## (undated) DEVICE — DECANTER VIAL 2006S

## (undated) DEVICE — GLOVE PROTEXIS W/NEU-THERA 7.5  2D73TE75

## (undated) DEVICE — SYR 10ML LL W/O NDL 302995

## (undated) DEVICE — SU VICRYL 2-0 SH 27" J317H

## (undated) DEVICE — SOL NACL 0.9% IRRIG 1000ML BOTTLE 2F7124

## (undated) RX ORDER — FENTANYL CITRATE 50 UG/ML
INJECTION, SOLUTION INTRAMUSCULAR; INTRAVENOUS
Status: DISPENSED
Start: 2022-07-25

## (undated) RX ORDER — FENTANYL CITRATE 50 UG/ML
INJECTION, SOLUTION INTRAMUSCULAR; INTRAVENOUS
Status: DISPENSED
Start: 2022-09-09